# Patient Record
Sex: FEMALE | Race: WHITE | Employment: OTHER | URBAN - METROPOLITAN AREA
[De-identification: names, ages, dates, MRNs, and addresses within clinical notes are randomized per-mention and may not be internally consistent; named-entity substitution may affect disease eponyms.]

---

## 2019-10-17 NOTE — H&P
[facility]    PATIENT'S NAME: Aria MAGUIRE   ATTENDING PHYSICIAN: Sidney Rucker.  Tania Valladares MD   PATIENT ACCOUNT#:   [id number]    LOCATION:    MEDICAL RECORD #:   [id number]       YOB: 1940  ADMISSION DATE:       10/29/2019    HI subsequent nail changes along the ulnar aspect of the index fingernail are noted with cracking, chipping, and a depression deformity. The patient has a breakdown of the eponychial epidermis and almost a keratotic-like change to it.   There is no drainage o which include the aforementioned items but not limited to them. No guarantee of outcome was given. The patient understands that this is the case. Surgery has been scheduled accordingly and we will proceed. Dictated By Ranjana Burgess MD  d: 1

## 2019-10-26 RX ORDER — BIOTIN 1 MG
1 TABLET ORAL DAILY
COMMUNITY

## 2019-10-26 RX ORDER — MELATONIN
1000 DAILY
COMMUNITY

## 2019-10-26 RX ORDER — DULOXETIN HYDROCHLORIDE 30 MG/1
30 CAPSULE, DELAYED RELEASE ORAL DAILY
COMMUNITY

## 2019-10-26 RX ORDER — ATORVASTATIN CALCIUM 20 MG/1
20 TABLET, FILM COATED ORAL NIGHTLY
COMMUNITY

## 2019-10-29 ENCOUNTER — HOSPITAL ENCOUNTER (OUTPATIENT)
Facility: HOSPITAL | Age: 79
Setting detail: HOSPITAL OUTPATIENT SURGERY
Discharge: HOME | End: 2019-10-29
Attending: PLASTIC SURGERY | Admitting: PLASTIC SURGERY
Payer: MEDICARE

## 2019-10-29 VITALS
DIASTOLIC BLOOD PRESSURE: 73 MMHG | WEIGHT: 128 LBS | BODY MASS INDEX: 25.13 KG/M2 | HEIGHT: 60 IN | SYSTOLIC BLOOD PRESSURE: 128 MMHG

## 2019-10-29 DIAGNOSIS — M67.442 GANGLION CYST OF JOINT OF FINGER OF LEFT HAND: Primary | ICD-10-CM

## 2019-10-29 PROCEDURE — 88304 TISSUE EXAM BY PATHOLOGIST: CPT | Performed by: PLASTIC SURGERY

## 2019-10-29 PROCEDURE — 0RBX0ZZ EXCISION OF LEFT FINGER PHALANGEAL JOINT, OPEN APPROACH: ICD-10-PCS | Performed by: PLASTIC SURGERY

## 2019-10-29 RX ORDER — DIAPER,BRIEF,INFANT-TODD,DISP
EACH MISCELLANEOUS AS NEEDED
Status: DISCONTINUED | OUTPATIENT
Start: 2019-10-29 | End: 2019-10-29 | Stop reason: HOSPADM

## 2019-10-29 RX ORDER — ACETAMINOPHEN 325 MG/1
650 TABLET ORAL EVERY 6 HOURS PRN
Status: DISCONTINUED | OUTPATIENT
Start: 2019-10-29 | End: 2019-10-29

## 2019-10-29 RX ORDER — LIDOCAINE HYDROCHLORIDE 10 MG/ML
INJECTION, SOLUTION EPIDURAL; INFILTRATION; INTRACAUDAL; PERINEURAL AS NEEDED
Status: DISCONTINUED | OUTPATIENT
Start: 2019-10-29 | End: 2019-10-29 | Stop reason: HOSPADM

## 2019-10-29 NOTE — OPERATIVE REPORT
[facility]    PATIENT'S NAME: Kodi Wren ANDREZ   ATTENDING PHYSICIAN: Logan Dave. Pooja Lopez MD   OPERATING PHYSICIAN: Logan Lopez MD   PATIENT ACCOUNT#:   [id number]    LOCATION:  [address]  MEDICAL RECORD #:   [id number] off the skin from both the radial and ulnar aspect of the dome of the cyst, leaving that small 2 to 3 mm wedge of skin attached to the cyst itself.   This was then traced proximally, getting into the subcutaneous space as one approached the dorsal ulnar asp Lisa Franklin MD  d: 10/29/2019 10:46:31  t: 10/29/2019 11:11:23  [facility] [id number]  WXE/

## 2019-10-29 NOTE — BRIEF OP NOTE
Pre-Operative Diagnosis: ganglion cyst left index finger     Post-Operative Diagnosis: ganglion cyst left index finger      Procedure Performed:   Procedure(s):  excision ganglion cyst left index finger with wound repair    Surgeon(s) and Role:     * Danny

## 2019-10-29 NOTE — DISCHARGE SUMMARY
[facility]    Discharge Summary    Joanna Patiño Patient Status:  Hospital Outpatient Surgery    1/3/1940 MRN [id number]   Location [facility] PRE OP RECOVERY Attending Mallory Alejo MD   Hosp Day # 0 PCP No primary c dressing dry, use a plastic bag over the hand and dressing while bathing. Splints are used to hold fingers and hand still. Do not bend or damage the splint with activity. Doctor will change your dressing at the office.     2. Elevation:  When sitting or

## 2019-10-29 NOTE — INTERVAL H&P NOTE
Pre-op Diagnosis: ganglion cyst left index finger    The above referenced H&P was reviewed by Pedro Santacruz MD on 10/29/2019, the patient was examined and no significant changes have occurred in the patient's condition since the H&P was performed.

## 2021-07-07 ENCOUNTER — HOSPITAL ENCOUNTER (OUTPATIENT)
Dept: ULTRASOUND IMAGING | Facility: HOSPITAL | Age: 81
Discharge: HOME | End: 2021-07-07
Attending: ORTHOPAEDIC SURGERY
Payer: MEDICARE

## 2021-07-07 DIAGNOSIS — I82.409 DVT (DEEP VENOUS THROMBOSIS) (HCC): ICD-10-CM

## 2021-07-07 PROCEDURE — 93971 EXTREMITY STUDY: CPT | Performed by: ORTHOPAEDIC SURGERY

## 2022-08-19 ENCOUNTER — OFFICE VISIT (OUTPATIENT)
Dept: OTOLARYNGOLOGY | Facility: CLINIC | Age: 82
End: 2022-08-19
Payer: COMMERCIAL

## 2022-08-19 VITALS — HEIGHT: 60 IN | WEIGHT: 128 LBS | BODY MASS INDEX: 25.13 KG/M2

## 2022-08-19 DIAGNOSIS — H60.8X3 CHRONIC ECZEMATOUS OTITIS EXTERNA OF BOTH EARS: ICD-10-CM

## 2022-08-19 DIAGNOSIS — H81.11 BENIGN PAROXYSMAL POSITIONAL VERTIGO OF RIGHT EAR: Primary | ICD-10-CM

## 2022-08-19 PROCEDURE — 1126F AMNT PAIN NOTED NONE PRSNT: CPT | Performed by: SPECIALIST

## 2022-08-19 PROCEDURE — 3008F BODY MASS INDEX DOCD: CPT | Performed by: SPECIALIST

## 2022-08-19 PROCEDURE — 99203 OFFICE O/P NEW LOW 30 MIN: CPT | Performed by: SPECIALIST

## 2022-08-19 RX ORDER — UBIDECARENONE 75 MG
CAPSULE ORAL AS DIRECTED
COMMUNITY
End: 2022-08-19 | Stop reason: ALTCHOICE

## 2022-08-19 RX ORDER — ROSUVASTATIN CALCIUM 5 MG/1
20 TABLET, COATED ORAL DAILY
COMMUNITY

## 2022-08-19 RX ORDER — DULOXETIN HYDROCHLORIDE 60 MG/1
CAPSULE, DELAYED RELEASE ORAL
COMMUNITY
Start: 2022-07-28

## 2022-08-19 RX ORDER — VALACYCLOVIR HYDROCHLORIDE 1 G/1
1000 TABLET, FILM COATED ORAL DAILY
COMMUNITY
Start: 2022-01-18

## 2022-08-19 RX ORDER — DENOSUMAB 60 MG/ML
60 INJECTION SUBCUTANEOUS
COMMUNITY

## 2022-08-19 RX ORDER — UBIDECARENONE 200 MG
CAPSULE ORAL DAILY
COMMUNITY

## 2022-08-19 NOTE — PATIENT INSTRUCTIONS
Canalolith repositioning maneuvers were done in the office for your vertigo. Ordered Valisone cream apply with fingertip to ear canal at bedtime as needed for itching. Follow-up with any additional questions or problems.

## (undated) DEVICE — PETROLATUM GAUZE CISION DRESSING: Brand: VASELINE

## (undated) DEVICE — ENCORE® LATEX MICRO SIZE 7.5, STERILE LATEX POWDER-FREE SURGICAL GLOVE: Brand: ENCORE

## (undated) DEVICE — SUCTION CANISTER, 3000CC,SAFELINER: Brand: DEROYAL

## (undated) DEVICE — SUTURE ETHILON 5-0 698G

## (undated) DEVICE — OUTPATIENT: Brand: MEDLINE INDUSTRIES, INC.

## (undated) DEVICE — GAUZE SPONGES,8 PLY: Brand: CURITY

## (undated) DEVICE — WEBRIL COTTON UNDERCAST PADDING: Brand: WEBRIL

## (undated) DEVICE — SOL  .9 1000ML BTL

## (undated) DEVICE — EXSANGUINATING DIGITAL TOURNIQUETS TO CREATE A BLOODLESS FIELD FOR PROCEDURES ON FINGERS AND TOES.: Brand: TOURNI-COT - LARGE

## (undated) DEVICE — CAUTERY BLADE 2IN INS E1455